# Patient Record
Sex: FEMALE | Race: BLACK OR AFRICAN AMERICAN | NOT HISPANIC OR LATINO | Employment: STUDENT | RURAL
[De-identification: names, ages, dates, MRNs, and addresses within clinical notes are randomized per-mention and may not be internally consistent; named-entity substitution may affect disease eponyms.]

---

## 2023-01-16 ENCOUNTER — OFFICE VISIT (OUTPATIENT)
Dept: FAMILY MEDICINE CLINIC | Facility: CLINIC | Age: 23
End: 2023-01-16
Payer: COMMERCIAL

## 2023-01-16 VITALS
WEIGHT: 137 LBS | SYSTOLIC BLOOD PRESSURE: 122 MMHG | BODY MASS INDEX: 24.27 KG/M2 | HEIGHT: 63 IN | OXYGEN SATURATION: 98 % | HEART RATE: 97 BPM | DIASTOLIC BLOOD PRESSURE: 76 MMHG

## 2023-01-16 DIAGNOSIS — F41.9 ANXIETY: ICD-10-CM

## 2023-01-16 DIAGNOSIS — Z30.41 ENCOUNTER FOR SURVEILLANCE OF CONTRACEPTIVE PILLS: ICD-10-CM

## 2023-01-16 DIAGNOSIS — Z11.3 SCREENING FOR STD (SEXUALLY TRANSMITTED DISEASE): ICD-10-CM

## 2023-01-16 DIAGNOSIS — F33.1 MAJOR DEPRESSIVE DISORDER, RECURRENT, MODERATE: Primary | ICD-10-CM

## 2023-01-16 LAB
B-HCG UR QL: NEGATIVE
EXPIRATION DATE: NORMAL
INTERNAL NEGATIVE CONTROL: NORMAL
INTERNAL POSITIVE CONTROL: NORMAL
Lab: NORMAL

## 2023-01-16 PROCEDURE — 99204 OFFICE O/P NEW MOD 45 MIN: CPT | Performed by: PHYSICIAN ASSISTANT

## 2023-01-16 PROCEDURE — 81025 URINE PREGNANCY TEST: CPT | Performed by: PHYSICIAN ASSISTANT

## 2023-01-16 RX ORDER — NORGESTIMATE AND ETHINYL ESTRADIOL 7DAYSX3 LO
KIT ORAL
Qty: 84 TABLET | Refills: 0 | Status: SHIPPED | OUTPATIENT
Start: 2023-01-16

## 2023-01-16 RX ORDER — CITALOPRAM 40 MG/1
40 TABLET ORAL DAILY
COMMUNITY

## 2023-01-16 RX ORDER — LEVONORGESTREL AND ETHINYL ESTRADIOL 0.15-0.03
1 KIT ORAL DAILY
COMMUNITY
End: 2023-01-16 | Stop reason: ALTCHOICE

## 2023-01-16 NOTE — PROGRESS NOTES
"Chief Complaint  establish care- depression/anxiety and wants to change iqra (Also wants to change birth control due to acne)    Subjective          Elisha Rasmussen presents to [facility]  History of Present Illness  Patient in  today to establish care and to discuss changing her birth control pill.  She states she has been on this current one for the past several months and feels that is encouraged her to have more facial acne and would like to switch.  She denies any changes to her menstrual cycle.  States last menstrual period was around one month ago. Denies chest pain or shortness of breath.  Denies any headaches.  Is a non-smoker.  Denies any family history of blood clotting disorders.  She also states has issues with anxiety and depression and does not feel her citalopram is working as well as in past. She does see a therapist once a week. PHQ9 of 23 . She states does not have have any acute thoughts of self harm and when she did have those thoughts, states did not have any intent to act upon them.   Anxiety  Presents for follow-up visit. Symptoms include depressed mood and nervous/anxious behavior. Patient reports no chest pain, decreased concentration, dizziness, irritability, nausea, palpitations, shortness of breath or suicidal ideas.     Her past medical history is significant for depression.   Depression  Visit Type: follow-up  Patient presents with the following symptoms: depressed mood, nervousness/anxiety and thoughts of death.  Patient is not experiencing: chest pain, decreased concentration, irritability, palpitations, shortness of breath and suicidal ideas.        Objective   Vital Signs:   /76 (BP Location: Left arm, Patient Position: Sitting, Cuff Size: Adult)   Pulse 97   Ht 160 cm (63\")   Wt 62.1 kg (137 lb)   SpO2 98%   BMI 24.27 kg/m²     Body mass index is 24.27 kg/m².    Review of Systems   Constitutional: Negative for fatigue and irritability. "   HENT: Negative for congestion and sore throat.    Respiratory: Negative for shortness of breath.    Cardiovascular: Negative for chest pain, palpitations and leg swelling.   Gastrointestinal: Negative for abdominal pain, diarrhea, nausea and vomiting.   Genitourinary: Negative for vaginal bleeding, vaginal discharge and vaginal pain.   Neurological: Negative for dizziness and headache.   Psychiatric/Behavioral: Positive for depressed mood. Negative for decreased concentration and suicidal ideas. The patient is nervous/anxious.        Past History:  Medical History: has a past medical history of Anxiety and Depression.   Surgical History: has no past surgical history on file.   Family History: family history includes Anxiety disorder in her mother; Bipolar disorder in her brother; Cancer in her father; Depression in her mother; Diabetes in her brother and mother. She was adopted.   Social History: reports that she has never smoked. She has never used smokeless tobacco. She reports that she does not drink alcohol and does not use drugs.      Current Outpatient Medications:   •  citalopram (CeleXA) 40 MG tablet, Take 40 mg by mouth Daily., Disp: , Rfl:   •  norgestimate-ethinyl estradiol (ORTHO TRI-CYCLEN LO) 0.18/0.215/0.25 MG-25 MCG per tablet, Take one pill by oral route once a day, Disp: 84 tablet, Rfl: 0  Allergies: Patient has no known allergies.    Physical Exam  Constitutional:       Appearance: Normal appearance.   HENT:      Right Ear: Tympanic membrane normal.      Left Ear: Tympanic membrane normal.      Mouth/Throat:      Pharynx: Oropharynx is clear.   Eyes:      Conjunctiva/sclera: Conjunctivae normal.      Pupils: Pupils are equal, round, and reactive to light.   Cardiovascular:      Rate and Rhythm: Normal rate and regular rhythm.      Heart sounds: Normal heart sounds.   Pulmonary:      Effort: Pulmonary effort is normal.      Breath sounds: Normal breath sounds.   Abdominal:      Palpations:  Abdomen is soft.      Tenderness: There is no abdominal tenderness.   Neurological:      Mental Status: She is oriented to person, place, and time.   Psychiatric:         Mood and Affect: Mood normal.         Behavior: Behavior normal.             Assessment and Plan   Diagnoses and all orders for this visit:    1. Major depressive disorder, recurrent, moderate (HCC) (Primary)  Recommend patient to get in with behavioral health to further discuss anxiety and depression symptoms since does not feel medication is working as well as did in the past. Recommend to continue f/up with therapist as directed. I have given patient contact information to schedule her appointment and discussed if any acute worsening of symptoms or thoughts of self harm recommend acute evaluation through ER or [facility]. Patient voices understanding.   2. Anxiety    3. Screening for STD (sexually transmitted disease)  -     Chlamydia trachomatis, Neisseria gonorrhoeae, Trichomonas vaginalis, PCR - Urine, Urine, Clean Catch; Future  Will send off urine for std screening. Encouraged condoms with each encounter for std prevention.   4. Encounter for surveillance of contraceptive pills  -     POC Pregnancy, Urine  Urine hcg negative. She will finish her current pack of pills and then can switch over to ortho-tricyclen lo. Monitor symptoms and menstrual cycles and recheck in 3 months, prior as needed. She states plans to get in with gyn and she will call to get scheduled.   Other orders  -     norgestimate-ethinyl estradiol (ORTHO TRI-CYCLEN LO) 0.18/0.215/0.25 MG-25 MCG per tablet; Take one pill by oral route once a day  Dispense: 84 tablet; Refill: 0            Follow Up   No follow-ups on file.  Patient was given instructions and counseling regarding her condition or for health maintenance advice. Please see specific information pulled into the AVS if appropriate.     Celina Marte PA-C

## 2023-01-17 LAB
C TRACH RRNA SPEC QL NAA+PROBE: NEGATIVE
N GONORRHOEA RRNA SPEC QL NAA+PROBE: NEGATIVE
T VAGINALIS RRNA SPEC QL NAA+PROBE: NEGATIVE

## 2023-03-15 ENCOUNTER — TELEPHONE (OUTPATIENT)
Dept: FAMILY MEDICINE CLINIC | Facility: CLINIC | Age: 23
End: 2023-03-15
Payer: COMMERCIAL

## 2023-03-15 NOTE — TELEPHONE ENCOUNTER
LVM for pt to call back and let know the form she left we are unable to fill out needs to come from behavioral health.

## 2023-03-16 ENCOUNTER — TELEPHONE (OUTPATIENT)
Dept: FAMILY MEDICINE CLINIC | Facility: CLINIC | Age: 23
End: 2023-03-16
Payer: COMMERCIAL

## 2023-04-17 RX ORDER — NORGESTIMATE AND ETHINYL ESTRADIOL 7DAYSX3 LO
KIT ORAL
Qty: 84 TABLET | Refills: 0 | OUTPATIENT
Start: 2023-04-17

## 2023-04-18 NOTE — TELEPHONE ENCOUNTER
Had refilled birth control until she got appt with gyn- please see when scheduled to see if I need to send another month's worth; thanks

## 2023-08-18 ENCOUNTER — OFFICE VISIT (OUTPATIENT)
Dept: FAMILY MEDICINE CLINIC | Facility: CLINIC | Age: 23
End: 2023-08-18
Payer: COMMERCIAL

## 2023-08-18 VITALS
HEART RATE: 82 BPM | WEIGHT: 139 LBS | BODY MASS INDEX: 24.63 KG/M2 | OXYGEN SATURATION: 98 % | HEIGHT: 63 IN | SYSTOLIC BLOOD PRESSURE: 118 MMHG | DIASTOLIC BLOOD PRESSURE: 64 MMHG

## 2023-08-18 DIAGNOSIS — L70.9 ACNE, UNSPECIFIED ACNE TYPE: Primary | ICD-10-CM

## 2023-08-18 PROCEDURE — 99213 OFFICE O/P EST LOW 20 MIN: CPT | Performed by: PHYSICIAN ASSISTANT

## 2023-08-18 RX ORDER — LEVONORGESTREL AND ETHINYL ESTRADIOL AND ETHINYL ESTRADIOL 150-30(84)
KIT ORAL
COMMUNITY

## 2023-08-18 RX ORDER — FLUOXETINE HYDROCHLORIDE 40 MG/1
CAPSULE ORAL
COMMUNITY
Start: 2023-06-01

## 2023-08-18 RX ORDER — CLINDAMYCIN AND BENZOYL PEROXIDE 10; 50 MG/G; MG/G
GEL TOPICAL
Qty: 35 G | Refills: 0 | Status: SHIPPED | OUTPATIENT
Start: 2023-08-18

## 2023-08-19 NOTE — PROGRESS NOTES
"Chief Complaint  Acne (Pt states has had face acne the past few months )    Subjective          Elisha Rasmussen presents to [facility]  History of Present Illness  Patient in today to discuss facial  acne. She states has progressed over past few months. She has used some topical prescription medication in past and felt it helped some. She states is also interested in dermatology evaluation as well.    Acne  This is a chronic problem. The problem occurs constantly. Pertinent negatives include no chest pain, fever or swollen glands.     Objective   Vital Signs:   /64 (BP Location: Left arm, Patient Position: Sitting, Cuff Size: Adult)   Pulse 82   Ht 160 cm (63\")   Wt 63 kg (139 lb)   SpO2 98%   BMI 24.62 kg/mý     Body mass index is 24.62 kg/mý.    Review of Systems   Constitutional:  Negative for fever.   HENT:  Negative for swollen glands.    Cardiovascular:  Negative for chest pain.   Skin:         Facial acne   Neurological:  Negative for headache.     Past History:  Medical History: has a past medical history of Anxiety and Depression.   Surgical History: has no past surgical history on file.   Family History: family history includes Anxiety disorder in her mother; Bipolar disorder in her brother; Cancer in her father; Depression in her mother; Diabetes in her brother and mother. She was adopted.   Social History: reports that she has never smoked. She has never used smokeless tobacco. She reports that she does not drink alcohol and does not use drugs.      Current Outpatient Medications:     Ashlyna 0.15-0.03 &0.01 MG, , Disp: , Rfl:     PROzac 40 MG capsule, , Disp: , Rfl:     clindamycin-benzoyl peroxide (BenzaClin) 1-5 % gel, Apply one application to affected area once a day, as needed, Disp: 35 g, Rfl: 0  Allergies: Patient has no known allergies.    Physical Exam  Constitutional:       Appearance: Normal appearance.   Cardiovascular:      Heart sounds: Normal heart " sounds.   Pulmonary:      Effort: Pulmonary effort is normal.      Breath sounds: Normal breath sounds.   Skin:     Comments: Facial acne noted with several pustules noted to forehead, bilateral cheeks and chin   Neurological:      Mental Status: She is alert and oriented to person, place, and time.   Psychiatric:         Mood and Affect: Mood normal.         Behavior: Behavior normal.           Assessment and Plan   Diagnoses and all orders for this visit:    1. Acne, unspecified acne type (Primary)  -     Ambulatory Referral to Dermatology  Encouraged good facial cleanser. Discussed can try benzaclin topically- recommend to start once a day every other day to make sure tolerates well before increasing use;  discussed potential side effects of redness, peeling, drying out of skin and risk of sun exposure ; will also put in referral for dermatology evaluation; rtc prn   Other orders  -     clindamycin-benzoyl peroxide (BenzaClin) 1-5 % gel; Apply one application to affected area once a day, as needed  Dispense: 35 g; Refill: 0            Follow Up   No follow-ups on file.  Patient was given instructions and counseling regarding her condition or for health maintenance advice. Please see specific information pulled into the AVS if appropriate.     Celina Marte PA-C